# Patient Record
Sex: MALE | Race: WHITE | URBAN - METROPOLITAN AREA
[De-identification: names, ages, dates, MRNs, and addresses within clinical notes are randomized per-mention and may not be internally consistent; named-entity substitution may affect disease eponyms.]

---

## 2017-02-14 ENCOUNTER — OFFICE VISIT (OUTPATIENT)
Dept: FAMILY MEDICINE | Facility: CLINIC | Age: 42
End: 2017-02-14

## 2017-02-14 VITALS
OXYGEN SATURATION: 99 % | SYSTOLIC BLOOD PRESSURE: 115 MMHG | TEMPERATURE: 97.7 F | BODY MASS INDEX: 27.76 KG/M2 | HEART RATE: 71 BPM | WEIGHT: 209 LBS | DIASTOLIC BLOOD PRESSURE: 80 MMHG

## 2017-02-14 DIAGNOSIS — J45.901 ASTHMA EXACERBATION: Primary | ICD-10-CM

## 2017-02-14 PROCEDURE — 99213 OFFICE O/P EST LOW 20 MIN: CPT | Performed by: FAMILY MEDICINE

## 2017-02-14 RX ORDER — AZITHROMYCIN 250 MG/1
TABLET, FILM COATED ORAL
Qty: 6 TABLET | Refills: 0 | Status: SHIPPED | OUTPATIENT
Start: 2017-02-14 | End: 2018-02-26

## 2017-02-14 NOTE — PROGRESS NOTES
14 days of purulent cough and sore throat. No fever or chills. No SOB or chest pain.  Nonsmoker, but mild persistent asthma. Remodeling with a lot of dust.  Current Outpatient Prescriptions   Medication     budesonide-formoterol (SYMBICORT) 80-4.5 MCG/ACT inhaler     albuterol (PROAIR HFA, PROVENTIL HFA, VENTOLIN HFA) 108 (90 BASE) MCG/ACT inhaler     No current facility-administered medications for this visit.        ROS: no nausea or vomiting  OBJECTIVE: /80 (BP Location: Left arm)  Pulse 71  Temp 97.7  F (36.5  C)  Wt 94.8 kg (209 lb)  SpO2 99%  BMI 27.76 kg/m2   NAD except fatigue. TM's OK. Turbinates normal. Pharynx injected. No nodes. Lungs are clear, and cor RRR.  ASSESSMENT: asthma with exacerbation, prob URI but remodeling also  PLAN: zpak

## 2017-02-14 NOTE — MR AVS SNAPSHOT
"              After Visit Summary   2017    Bowen Youngblood    MRN: [id number]           Patient Information     Date Of Birth          1975        Visit Information        Provider Department      2017 11:15 AM Bib Wright MD [facility]        Today's Diagnoses     Asthma exacerbation    -  1       Follow-ups after your visit        Who to contact     If you have questions or need follow up information about today's clinic visit or your schedule please contact [facility] directly at [phone].  Normal or non-critical lab and imaging results will be communicated to you by MyChart, letter or phone within 4 business days after the clinic has received the results. If you do not hear from us within 7 days, please contact the clinic through Zuldihart or phone. If you have a critical or abnormal lab result, we will notify you by phone as soon as possible.  Submit refill requests through [employer] or call your pharmacy and they will forward the refill request to us. Please allow 3 business days for your refill to be completed.          Additional Information About Your Visit        MyChart Information     [employer] lets you send messages to your doctor, view your test results, renew your prescriptions, schedule appointments and more. To sign up, go to [email or web address] . Click on \"Log in\" on the left side of the screen, which will take you to the Welcome page. Then click on \"Sign up Now\" on the right side of the page.     You will be asked to enter the access code listed below, as well as some personal information. Please follow the directions to create your username and password.     Your access code is: [email or web address]  Expires: 5/15/2017 12:37 PM     Your access code will  in 90 days. If you need help or a new code, please call your [place] clinic or [phone].        Care EveryWhere ID     This is your Care EveryWhere ID. This could be used by other organizations " to access your [place] medical records  [id number]        Your Vitals Were     Pulse Temperature Pulse Oximetry BMI (Body Mass Index)          71 97.7  F (36.5  C) 99% 27.76 kg/m2         Blood Pressure from Last 3 Encounters:   02/14/17 115/80   11/18/16 104/70   08/24/15 120/78    Weight from Last 3 Encounters:   02/14/17 94.8 kg (209 lb)   11/18/16 97.1 kg (214 lb)   08/24/15 97.5 kg (215 lb)              Today, you had the following     No orders found for display         Today's Medication Changes          These changes are accurate as of: 2/14/17 12:37 PM.  If you have any questions, ask your nurse or doctor.               Start taking these medicines.        Dose/Directions    azithromycin 250 MG tablet   Commonly known as:  ZITHROMAX   Used for:  Asthma exacerbation   Started by:  Bib Wright MD        Two tablets first day, then one tablet daily for four days.   Quantity:  6 tablet   Refills:  0            Where to get your medicines      These medications were sent to [facility] [id number] [place] [address] [address] [facility] [address] [facility] [address]     Phone:  [phone]     azithromycin 250 MG tablet                Primary Care Provider Office Phone # Fax #    Bib Wright [phone]       [facility] [address]  [facility] [address]        Thank you!     Thank you for choosing [facility]  for your care. Our goal is always to provide you with excellent care. Hearing back from our patients is one way we can continue to improve our services. Please take a few minutes to complete the written survey that you may receive in the mail after your visit with us. Thank you!             Your Updated Medication List - Protect others around you: Learn how to safely use, store and throw away your medicines at www.disposemymeds.org.          This list is accurate as of: 2/14/17 12:37 PM.  Always use  your most recent med list.                   Brand Name Dispense Instructions for use    albuterol 108 (90 BASE) MCG/ACT Inhaler    PROAIR HFA/PROVENTIL HFA/VENTOLIN HFA    1 Inhaler    Inhale 2 puffs into the lungs every 6 hours as needed for shortness of breath / dyspnea or wheezing       azithromycin 250 MG tablet    ZITHROMAX    6 tablet    Two tablets first day, then one tablet daily for four days.       budesonide-formoterol 80-4.5 MCG/ACT Inhaler    SYMBICORT    3 Inhaler    INHALE 2 PUFFS BY MOUTH TWICE DAILY

## 2017-12-19 ENCOUNTER — TELEPHONE (OUTPATIENT)
Dept: FAMILY MEDICINE | Facility: CLINIC | Age: 42
End: 2017-12-19

## 2017-12-19 NOTE — LETTER
My Asthma Action Plan  Name: Bowen Youngblood   YOB: 1975  Date: 12/19/2017   My doctor: Bib Wright MD   My clinic: [facility]        My Control Medicine: Budesonide + formoterol (Symbicort) -  80/4.5 mcg 2 puffs BID  My Rescue Medicine: Albuterol (Proair/Ventolin/Proventil) inhaler 2 puffs Q 6hours PRN   My Asthma Severity: mild persistent  Avoid your asthma triggers: animal dander, mold and cold air               GREEN ZONE   Good Control    I feel good    No cough or wheeze    Can work, sleep and play without asthma symptoms       Take your asthma control medicine every day.     1. If exercise triggers your asthma, take your rescue medication    15 minutes before exercise or sports, and    During exercise if you have asthma symptoms  2. Spacer to use with inhaler: If you have a spacer, make sure to use it with your inhaler             YELLOW ZONE Getting Worse  I have ANY of these:    I do not feel good    Cough or wheeze    Chest feels tight    Wake up at night   1. Keep taking your Green Zone medications  2. Start taking your rescue medicine:    every 20 minutes for up to 1 hour. Then every 4 hours for 24-48 hours.  3. If you stay in the Yellow Zone for more than 12-24 hours, contact your doctor.  4. If you do not return to the Green Zone in 12-24 hours or you get worse, start taking your oral steroid medicine if prescribed by your provider.           RED ZONE Medical Alert - Get Help  I have ANY of these:    I feel awful    Medicine is not helping    Breathing getting harder    Trouble walking or talking    Nose opens wide to breathe       1. Take your rescue medicine NOW  2. If your provider has prescribed an oral steroid medicine, start taking it NOW  3. Call your doctor NOW  4. If you are still in the Red Zone after 20 minutes and you have not reached your doctor:    Take your rescue medicine again and    Call 911 or go to the emergency room right away    See your regular  doctor within 2 weeks of an Emergency Room or Urgent Care visit for follow-up treatment.        Electronically signed by: Tricia Glover, December 19, 2017    Annual Reminders:  Meet with Asthma Educator,  Flu Shot in the Fall, consider Pneumonia Vaccination for patients with asthma (aged 19 and older).    Pharmacy: [facility] [address] [place], [place] - [address] AT [facility] [address]                    Asthma Triggers  How To Control Things That Make Your Asthma Worse    Triggers are things that make your asthma worse.  Look at the list below to help you find your triggers and what you can do about them.  You can help prevent asthma flare-ups by staying away from your triggers.      Trigger                                                          What you can do   Cigarette Smoke  Tobacco smoke can make asthma worse. Do not allow smoking in your home, car or around you.  Be sure no one smokes at a child s day care or school.  If you smoke, ask your health care provider for ways to help you quit.  Ask family members to quit too.  Ask your health care provider for a referral to Quit Plan to help you quit smoking, or call 1-196-376-PLAN.     Colds, Flu, Bronchitis  These are common triggers of asthma. Wash your hands often.  Don t touch your eyes, nose or mouth.  Get a flu shot every year.     Dust Mites  These are tiny bugs that live in cloth or carpet. They are too small to see. Wash sheets and blankets in hot water every week.   Encase pillows and mattress in dust mite proof covers.  Avoid having carpet if you can. If you have carpet, vacuum weekly.   Use a dust mask and HEPA vacuum.   Pollen and Outdoor Mold  Some people are allergic to trees, grass, or weed pollen, or molds. Try to keep your windows closed.  Limit time out doors when pollen count is high.   Ask you health care provider about taking medicine during allergy season.     Animal Dander  Some people are allergic to skin flakes,  urine or saliva from pets with fur or feathers. Keep pets with fur or feathers out of your home.    If you can t keep the pet outdoors, then keep the pet out of your bedroom.  Keep the bedroom door closed.  Keep pets off cloth furniture and away from stuffed toys.     Mice, Rats, and Cockroaches  Some people are allergic to the waste from these pests.   Cover food and garbage.  Clean up spills and food crumbs.  Store grease in the refrigerator.   Keep food out of the bedroom.   Indoor Mold  This can be a trigger if your home has high moisture. Fix leaking faucets, pipes, or other sources of water.   Clean moldy surfaces.  Dehumidify basement if it is damp and smelly.   Smoke, Strong Odors, and Sprays  These can reduce air quality. Stay away from strong odors and sprays, such as perfume, powder, hair spray, paints, smoke incense, paint, cleaning products, candles and new carpet.   Exercise or Sports  Some people with asthma have this trigger. Be active!  Ask your doctor about taking medicine before sports or exercise to prevent symptoms.    Warm up for 5-10 minutes before and after sports or exercise.     Other Triggers of Asthma  Cold air:  Cover your nose and mouth with a scarf.  Sometimes laughing or crying can be a trigger.  Some medicines and food can trigger asthma.

## 2017-12-19 NOTE — TELEPHONE ENCOUNTER
Spoke with patient to update ACT and AAP.  Advised patient he was due to be seen, he states he will call and schedule after new year.  Tricia Gonzalez

## 2017-12-20 ASSESSMENT — ASTHMA QUESTIONNAIRES: ACT_TOTALSCORE: 21

## 2018-02-26 ENCOUNTER — OFFICE VISIT (OUTPATIENT)
Dept: FAMILY MEDICINE | Facility: CLINIC | Age: 43
End: 2018-02-26

## 2018-02-26 VITALS
RESPIRATION RATE: 16 BRPM | HEART RATE: 79 BPM | SYSTOLIC BLOOD PRESSURE: 116 MMHG | WEIGHT: 207 LBS | OXYGEN SATURATION: 97 % | DIASTOLIC BLOOD PRESSURE: 80 MMHG | BODY MASS INDEX: 27.5 KG/M2

## 2018-02-26 DIAGNOSIS — Z76.0 ENCOUNTER FOR MEDICATION REFILL: ICD-10-CM

## 2018-02-26 DIAGNOSIS — J45.30 MILD PERSISTENT ASTHMA, UNSPECIFIED WHETHER COMPLICATED: ICD-10-CM

## 2018-02-26 PROCEDURE — 99213 OFFICE O/P EST LOW 20 MIN: CPT | Performed by: FAMILY MEDICINE

## 2018-02-26 RX ORDER — BUDESONIDE AND FORMOTEROL FUMARATE DIHYDRATE 80; 4.5 UG/1; UG/1
AEROSOL RESPIRATORY (INHALATION)
Qty: 10.2 G | Refills: 6 | Status: SHIPPED | OUTPATIENT
Start: 2018-02-26 | End: 2018-04-09

## 2018-02-26 NOTE — MR AVS SNAPSHOT
"              After Visit Summary   2018    Bowen Youngblood    MRN: [id number]           Patient Information     Date Of Birth          1975        Visit Information        Provider Department      2018 4:15 PM Bib Wright MD [facility]        Today's Diagnoses     Encounter for medication refill        Mild persistent asthma, unspecified whether complicated           Follow-ups after your visit        Who to contact     If you have questions or need follow up information about today's clinic visit or your schedule please contact [facility] directly at [phone].  Normal or non-critical lab and imaging results will be communicated to you by ExactTargethart, letter or phone within 4 business days after the clinic has received the results. If you do not hear from us within 7 days, please contact the clinic through [employer]inn [employer] or phone. If you have a critical or abnormal lab result, we will notify you by phone as soon as possible.  Submit refill requests through Million[employer]1 or call your pharmacy and they will forward the refill request to us. Please allow 3 business days for your refill to be completed.          Additional Information About Your Visit        MyChart Information     [employer] lets you send messages to your doctor, view your test results, renew your prescriptions, schedule appointments and more. To sign up, go to [email or web address] . Click on \"Log in\" on the left side of the screen, which will take you to the Welcome page. Then click on \"Sign up Now\" on the right side of the page.     You will be asked to enter the access code listed below, as well as some personal information. Please follow the directions to create your username and password.     Your access code is: [email or web address]  Expires: 2018  6:17 PM     Your access code will  in 90 days. If you need help or a new code, please call your [place] clinic or [phone].        Care EveryWhere ID     This " is your Care EveryWhere ID. This could be used by other organizations to access your [place] medical records  [id number]        Your Vitals Were     Pulse Respirations Pulse Oximetry BMI (Body Mass Index)          79 16 97% 27.5 kg/m2         Blood Pressure from Last 3 Encounters:   02/26/18 116/80   02/14/17 115/80   11/18/16 104/70    Weight from Last 3 Encounters:   02/26/18 93.9 kg (207 lb)   02/14/17 94.8 kg (209 lb)   11/18/16 97.1 kg (214 lb)              Today, you had the following     No orders found for display         Today's Medication Changes          These changes are accurate as of 2/26/18  6:17 PM.  If you have any questions, ask your nurse or doctor.               These medicines have changed or have updated prescriptions.        Dose/Directions    budesonide-formoterol 80-4.5 MCG/ACT Inhaler   Commonly known as:  SYMBICORT   This may have changed:  See the new instructions.   Used for:  Encounter for medication refill        INHALE 2 PUFFS BY MOUTH TWICE DAILY   Quantity:  10.2 g   Refills:  6         Stop taking these medicines if you haven't already. Please contact your care team if you have questions.     azithromycin 250 MG tablet   Commonly known as:  ZITHROMAX                Where to get your medicines      These medications were sent to [facility] [id number] [place] [address] [address] AT [facility] [address] [facility] [address]     Phone:  [phone]     budesonide-formoterol 80-4.5 MCG/ACT Inhaler                Primary Care Provider Office Phone # Fax #    Bib Wright [phone] [address]  [facility] [address]        Equal Access to Services     ALICE DOLAN AH: Melina He, jonas thomas, rubén kaalmaadam boggs, arjun jerry. So [facility] [phone].    ATENCIÓN: Si habla español, tiene a ugo disposición servicios gratuitos de asistencia lingüística. Llame  al [phone].    We comply with applicable federal civil rights laws and [place] laws. We do not discriminate on the basis of race, color, national origin, age, disability, sex, sexual orientation, or gender identity.            Thank you!     Thank you for choosing [facility]  for your care. Our goal is always to provide you with excellent care. Hearing back from our patients is one way we can continue to improve our services. Please take a few minutes to complete the written survey that you may receive in the mail after your visit with us. Thank you!             Your Updated Medication List - Protect others around you: Learn how to safely use, store and throw away your medicines at www.disposemymeds.org.          This list is accurate as of 2/26/18  6:17 PM.  Always use your most recent med list.                   Brand Name Dispense Instructions for use Diagnosis    albuterol 108 (90 BASE) MCG/ACT Inhaler    PROAIR HFA/PROVENTIL HFA/VENTOLIN HFA    1 Inhaler    Inhale 2 puffs into the lungs every 6 hours as needed for shortness of breath / dyspnea or wheezing    Asthma, mild persistent       budesonide-formoterol 80-4.5 MCG/ACT Inhaler    SYMBICORT    10.2 g    INHALE 2 PUFFS BY MOUTH TWICE DAILY    Encounter for medication refill

## 2018-02-26 NOTE — PROGRESS NOTES
Here for Symbicort refill. Doing very well with his asthma. No hospitalization. His last albuterol inhaler was prescribed 3 years ago. using Symbicort once a day.  OBJECTIVE: /80  Pulse 79  Resp 16  Wt 93.9 kg (207 lb)  SpO2 97%  BMI 27.5 kg/m2 NAD good mood. Lungs are clear, with good flow  (Z76.0) Encounter for medication refill  Comment:   Plan: budesonide-formoterol (SYMBICORT) 80-4.5         MCG/ACT Inhaler            (J45.30) Mild persistent asthma, unspecified whether complicated  Comment:   Plan: no change

## 2018-04-09 ENCOUNTER — TELEPHONE (OUTPATIENT)
Dept: FAMILY MEDICINE | Facility: CLINIC | Age: 43
End: 2018-04-09

## 2018-04-09 DIAGNOSIS — J45.909 ASTHMA: Primary | ICD-10-CM

## 2018-04-09 NOTE — TELEPHONE ENCOUNTER
Received fax from [employer] stating patients rx for Symbicort is not covered under his insurance any more. Per Dr Wright rx can be changed to Breo.   [facility]  Called patient to inform him of change in rx.

## 2019-04-11 DIAGNOSIS — J45.30 MILD PERSISTENT ASTHMA WITHOUT COMPLICATION: Primary | ICD-10-CM

## 2019-04-19 ENCOUNTER — OFFICE VISIT (OUTPATIENT)
Dept: FAMILY MEDICINE | Facility: CLINIC | Age: 44
End: 2019-04-19

## 2019-04-19 VITALS
OXYGEN SATURATION: 98 % | WEIGHT: 224 LBS | SYSTOLIC BLOOD PRESSURE: 128 MMHG | HEART RATE: 73 BPM | BODY MASS INDEX: 29.76 KG/M2 | DIASTOLIC BLOOD PRESSURE: 82 MMHG

## 2019-04-19 DIAGNOSIS — Z23 NEED FOR VACCINATION: Primary | ICD-10-CM

## 2019-04-19 DIAGNOSIS — J45.30 MILD PERSISTENT ASTHMA WITHOUT COMPLICATION: ICD-10-CM

## 2019-04-19 PROCEDURE — 99213 OFFICE O/P EST LOW 20 MIN: CPT | Mod: 25 | Performed by: FAMILY MEDICINE

## 2019-04-19 PROCEDURE — 90732 PPSV23 VACC 2 YRS+ SUBQ/IM: CPT | Performed by: FAMILY MEDICINE

## 2019-04-19 PROCEDURE — 90471 IMMUNIZATION ADMIN: CPT | Performed by: FAMILY MEDICINE

## 2019-04-19 RX ORDER — ALBUTEROL SULFATE 90 UG/1
2 AEROSOL, METERED RESPIRATORY (INHALATION) EVERY 6 HOURS PRN
Qty: 18 G | Refills: 11 | Status: SHIPPED | OUTPATIENT
Start: 2019-04-19

## 2019-04-19 NOTE — LETTER
My Asthma Action Plan  Name: Bowen Youngblood   YOB: 1975  Date: 4/24/2019   My doctor: Bowen Grewal MD   My clinic: [facility]        My Control Medicine: BREO  My Rescue Medicine: Albuterol (Proair/Ventolin/Proventil) inhaler .   My Asthma Severity: mild persistent  Avoid your asthma triggers: upper respiratory infections and pollens               GREEN ZONE   Good Control    I feel good    No cough or wheeze    Can work, sleep and play without asthma symptoms       Take your asthma control medicine every day.     1. If exercise triggers your asthma, take your rescue medication    15 minutes before exercise or sports, and    During exercise if you have asthma symptoms  2. Spacer to use with inhaler: If you have a spacer, make sure to use it with your inhaler             YELLOW ZONE Getting Worse  I have ANY of these:    I do not feel good    Cough or wheeze    Chest feels tight    Wake up at night   1. Keep taking your Green Zone medications  2. Start taking your rescue medicine:    every 20 minutes for up to 1 hour. Then every 4 hours for 24-48 hours.  3. If you stay in the Yellow Zone for more than 12-24 hours, contact your doctor.  4. If you do not return to the Green Zone in 12-24 hours or you get worse, start taking your oral steroid medicine if prescribed by your provider.           RED ZONE Medical Alert - Get Help  I have ANY of these:    I feel awful    Medicine is not helping    Breathing getting harder    Trouble walking or talking    Nose opens wide to breathe       1. Take your rescue medicine NOW  2. If your provider has prescribed an oral steroid medicine, start taking it NOW  3. Call your doctor NOW  4. If you are still in the Red Zone after 20 minutes and you have not reached your doctor:    Take your rescue medicine again and    Call 911 or go to the emergency room right away    See your regular doctor within 2 weeks of an Emergency Room or Urgent Care visit for  follow-up treatment.          Annual Reminders:  Meet with Asthma Educator,  Flu Shot in the Fall, consider Pneumonia Vaccination for patients with asthma (aged 19 and older).    Pharmacy: [facility] [address] [place], [place] - [address] AT [facility] OF [address]                      Asthma Triggers  How To Control Things That Make Your Asthma Worse    Triggers are things that make your asthma worse.  Look at the list below to help you find your triggers and what you can do about them.  You can help prevent asthma flare-ups by staying away from your triggers.      Trigger                                                          What you can do   Cigarette Smoke  Tobacco smoke can make asthma worse. Do not allow smoking in your home, car or around you.  Be sure no one smokes at a child s day care or school.  If you smoke, ask your health care provider for ways to help you quit.  Ask family members to quit too.  Ask your health care provider for a referral to Quit Plan to help you quit smoking, or call 3-308-037-PLAN.     Colds, Flu, Bronchitis  These are common triggers of asthma. Wash your hands often.  Don t touch your eyes, nose or mouth.  Get a flu shot every year.     Dust Mites  These are tiny bugs that live in cloth or carpet. They are too small to see. Wash sheets and blankets in hot water every week.   Encase pillows and mattress in dust mite proof covers.  Avoid having carpet if you can. If you have carpet, vacuum weekly.   Use a dust mask and HEPA vacuum.   Pollen and Outdoor Mold  Some people are allergic to trees, grass, or weed pollen, or molds. Try to keep your windows closed.  Limit time out doors when pollen count is high.   Ask you health care provider about taking medicine during allergy season.     Animal Dander  Some people are allergic to skin flakes, urine or saliva from pets with fur or feathers. Keep pets with fur or feathers out of your home.    If you can t keep the pet  outdoors, then keep the pet out of your bedroom.  Keep the bedroom door closed.  Keep pets off cloth furniture and away from stuffed toys.     Mice, Rats, and Cockroaches  Some people are allergic to the waste from these pests.   Cover food and garbage.  Clean up spills and food crumbs.  Store grease in the refrigerator.   Keep food out of the bedroom.   Indoor Mold  This can be a trigger if your home has high moisture. Fix leaking faucets, pipes, or other sources of water.   Clean moldy surfaces.  Dehumidify basement if it is damp and smelly.   Smoke, Strong Odors, and Sprays  These can reduce air quality. Stay away from strong odors and sprays, such as perfume, powder, hair spray, paints, smoke incense, paint, cleaning products, candles and new carpet.   Exercise or Sports  Some people with asthma have this trigger. Be active!  Ask your doctor about taking medicine before sports or exercise to prevent symptoms.    Warm up for 5-10 minutes before and after sports or exercise.     Other Triggers of Asthma  Cold air:  Cover your nose and mouth with a scarf.  Sometimes laughing or crying can be a trigger.  Some medicines and food can trigger asthma.

## 2019-04-20 ASSESSMENT — ASTHMA QUESTIONNAIRES: ACT_TOTALSCORE: 22

## 2019-04-24 NOTE — PROGRESS NOTES
SUBJECTIVE:Bowen  presents today for follow up asthma.  Age at onset of asthma symptoms years ago.    Diagnosis of asthma made on the basis of: symptoms and response to medications.  History is negative for oral steroids for exacerbations and emergency room visits.  Family history is positive for asthma and past medical history is negative for allergic rhinitis and atopic dermatitis.  Current symptoms include: none.   Precipitating factors: pollens and URIs.    Current treatments: BREO.  He  is compliant with medication.  He  does not monitor home peak flow readings.  He  uses his  rescue inhaler 0 times a day and 1 times a month in the middle of the night.  He  feels that his  asthma does not limit his  ability to perform daily activities or physical activity.    ROS:  CONSTITUTIONAL: NEGATIVE for fever, chills  EYES: NEGATIVE for vision changes   RESP: NEGATIVE for significant cough or SOB  CV: NEGATIVE for chest pain, palpitations   GI: NEGATIVE for nausea, abdominal pain, heartburn, or change in bowel habits  : NEGATIVE for frequency, dysuria, or hematuria  MUSCULOSKELETAL: NEGATIVE for significant arthralgias or myalgia  NEURO: NEGATIVE for weakness, dizziness or paresthesias or headache    OBJECTIVE:   /82   Pulse 73   Wt 101.6 kg (224 lb)   SpO2 98%   BMI 29.76 kg/m    Exam:  GENERAL APPEARANCE: healthy, alert and no distress  EYES: EOMI,  PERRL  HENT: ear canals and TM's normal and nose and mouth without ulcers or lesions  RESP: lungs clear to auscultation - no rales, rhonchi or wheezes  CV: regular rates and rhythm, normal S1 S2, no S3 or S4 and no murmur, click or rub     ASSESSMENT:   Asthma: mild persistent    PLAN:  No change in therapy  Patient received information on asthma today  Discussed asthma action plan, see patient instructions

## 2019-10-10 ENCOUNTER — TELEPHONE (OUTPATIENT)
Dept: FAMILY MEDICINE | Facility: CLINIC | Age: 44
End: 2019-10-10

## 2019-10-10 DIAGNOSIS — J45.30 MILD PERSISTENT ASTHMA WITHOUT COMPLICATION: Primary | ICD-10-CM

## 2019-10-10 NOTE — TELEPHONE ENCOUNTER
Patient called clinic requesting prescription for Advair diskus, insurance coverage has changed and Breo is no longer covered. Patient reports that he has used Advair in the past with success. Per Dr Grewal prescription for this sent to pharmacy. Tricia Gonzalez

## 2020-01-13 ENCOUNTER — TELEPHONE (OUTPATIENT)
Dept: FAMILY MEDICINE | Facility: CLINIC | Age: 45
End: 2020-01-13

## 2020-01-13 NOTE — TELEPHONE ENCOUNTER
Received fax from [employer] requesting PA for [employer].  Submitted by fax to [employer]. Will wait for rresponse.

## 2020-01-27 NOTE — TELEPHONE ENCOUNTER
Received notice that [employer] is not the correct insurance for patients prescriptions.  Submitted new PA through [employer] on 01/13.  Received fax back with approval. Approval dates 12/14/2019-01/12/2023.  Called Effie  on 01/12 to inform of approval.

## 2020-04-13 DIAGNOSIS — J45.30 MILD PERSISTENT ASTHMA WITHOUT COMPLICATION: ICD-10-CM

## 2020-04-13 RX ORDER — ALBUTEROL SULFATE 90 UG/1
2 AEROSOL, METERED RESPIRATORY (INHALATION) EVERY 6 HOURS PRN
Qty: 18 G | Refills: 11 | OUTPATIENT
Start: 2020-04-13

## 2020-04-14 ENCOUNTER — VIRTUAL VISIT (OUTPATIENT)
Dept: FAMILY MEDICINE | Facility: CLINIC | Age: 45
End: 2020-04-14

## 2020-04-14 DIAGNOSIS — J45.30 MILD PERSISTENT ASTHMA WITHOUT COMPLICATION: ICD-10-CM

## 2020-04-14 PROCEDURE — 99213 OFFICE O/P EST LOW 20 MIN: CPT | Mod: GT | Performed by: FAMILY MEDICINE

## 2020-04-14 ASSESSMENT — ASTHMA QUESTIONNAIRES
QUESTION_3 LAST FOUR WEEKS HOW OFTEN DID YOUR ASTHMA SYMPTOMS (WHEEZING, COUGHING, SHORTNESS OF BREATH, CHEST TIGHTNESS OR PAIN) WAKE YOU UP AT NIGHT OR EARLIER THAN USUAL IN THE MORNING: NOT AT ALL
QUESTION_5 LAST FOUR WEEKS HOW WOULD YOU RATE YOUR ASTHMA CONTROL: WELL CONTROLLED
QUESTION_2 LAST FOUR WEEKS HOW OFTEN HAVE YOU HAD SHORTNESS OF BREATH: THREE TO SIX TIMES A WEEK
QUESTION_4 LAST FOUR WEEKS HOW OFTEN HAVE YOU USED YOUR RESCUE INHALER OR NEBULIZER MEDICATION (SUCH AS ALBUTEROL): TWO OR THREE TIMES PER WEEK
QUESTION_1 LAST FOUR WEEKS HOW MUCH OF THE TIME DID YOUR ASTHMA KEEP YOU FROM GETTING AS MUCH DONE AT WORK, SCHOOL OR AT HOME: NONE OF THE TIME
ACT_TOTALSCORE: 20

## 2020-04-14 NOTE — PROGRESS NOTES
"  Problem(s) Oriented visit      Bowen Youngblood is being evaluated via a billable video visit.      The patient has been notified of following:     \"This video visit will be conducted via a call between you and your physician/provider. We have found that certain health care needs can be provided without the need for an in-person physical exam.  This service lets us provide the care you need with a video conversation.  If a prescription is necessary we can send it directly to your pharmacy.  If lab work is needed we can place an order for that and you can then stop by our lab to have the test done at a later time.    If during the course of the call the physician/provider feels a video visit is not appropriate, you will not be charged for this service.\"     Physician has received verbal consent for a Video Visit from the patient? Yes    SUBJECTIVE:                                                      Bowen Youngblood is a 44 year old male who is being seen through video consult for evaluation.  He has a history of well controlled asthma.    Asthma stable.  Has not had any recent breathing troubles beyond usual baseline.  Has not any acute respiratory events.  Ran out of advair and has felt very mild increased SOB since then.  Using medication as directed with reported side effects    ACT Total Scores 12/19/2017 4/19/2019 4/14/2020   ACT TOTAL SCORE (Goal Greater than or Equal to 20) 21 22 20   In the past 12 months, how many times did you visit the emergency room for your asthma without being admitted to the hospital? 0 0 0   In the past 12 months, how many times were you hospitalized overnight because of your asthma? 0 0 0           Histories:   Patient Active Problem List   Diagnosis     Asthma, mild persistent     Past Surgical History:   Procedure Laterality Date     TONSILLECTOMY         Social History     Tobacco Use     Smoking status: Former Smoker     Smokeless tobacco: Never Used   Substance Use Topics     " "Alcohol use: Yes     Alcohol/week: 4.0 standard drinks     Types: 4 Standard drinks or equivalent per week     Family History   Problem Relation Age of Onset     Hypertension Father      Coronary Artery Disease Mother 71           Reviewed and updated as needed this visit by Provider       ROS:    A 5 system review was completed and is as noted in HPI and otherwise negative.            OBJECTIVE:                                                      Objective    There were no vitals taken for this visit.  Estimated body mass index is 29.76 kg/m  as calculated from the following:    Height as of 11/18/16: 1.848 m (6' 0.75\").    Weight as of 4/19/19: 101.6 kg (224 lb).      Gen: Well appearing, NAD  Eyes: Clear  Resp: Breathing comfortably, NAD  Skin: Clear  Psych: normal mood and affect  Neuro: grossly normal              ASSESSMENT/PLAN:                                                        Mild persistent asthma without complication: stable, needs advair refill.    -     fluticasone-salmeterol (ADVAIR) 100-50 MCG/DOSE inhaler; Inhale 1 puff into the lungs every 12 hours  - Recommend CPE in 6 months, sooner if concerns          "

## 2020-04-14 NOTE — PATIENT INSTRUCTIONS
Patient Education     Understanding Asthma    Asthma is a long-term (chronic) lung condition. It involves the airways (bronchial tubes). It happens when a trigger causes your airways to swell and become narrow. The muscles around your airways start to tighten. When your airways start to narrow, air can't move in and out of your lungs very well. Mucus also builds up along the airways. This makes it even harder to move air in and out of your lungs.   Experts are not exactly sure what causes asthma. It may be caused by a mix of inherited and environmental factors. People with asthma may have no symptoms until they are exposed to an allergen or trigger.  Healthy lungs  Inside your lungs there are branching airways made of stretchy tissue. Each airway is wrapped with bands of muscle. The airways are smaller as they go deeper into the lungs. The smallest airways end in clusters of tiny balloon-like air sacs (alveoli). These clusters are surrounded by blood vessels. When you breathe in (inhale), air enters the lungs. It travels down through the airways until it reaches the air sacs. When you breathe out (exhale), air travels up through the airways and out of the lungs. The airways make mucus that traps particles you breathe in. Normally, the mucus is then swept out of the lungs by tiny hairs (cilia) that line the airways. The mucus is swallowed or coughed up during your day.  What the lungs do  The air you inhale contains oxygen. When oxygen reaches the air sacs, it passes into the blood vessels around the sacs. Your blood then sends oxygen to all of your cells. As you exhale, carbon dioxide is removed in a similar way from the blood in the air sacs, and from your body.  When you have asthma  People with asthma have very sensitive airways. This means the airways react to certain things called triggers. Triggers can include pollen, dust, or smoke. Triggers cause inflammation. This makes the airways swell and become  narrow. This is a long-lasting (chronic) problem. Your airways may not always be narrow enough so that you notice breathing problems.  Symptoms of chronic inflammation include:     Coughing (chronic)    A feeling of tightness in your chest    Feeling short of breath    Wheezing (a whistling noise, especially when breathing out)    Low energy or feeling tired  In some people, over time chronic mild inflammation can lead to lasting (permanent) scarring of airways and loss of lung function.  Asthma flare-ups  When sensitive airways are irritated by a trigger, the muscles around the airways tighten. The lining of the airways swells. Thick, sticky mucus increases and partly clogs the airways. All of this makes it harder to breathe.  Symptoms of flare-ups may include:    Coughing, especially at night. You may not be able to sleep because of coughing.    Getting tired or out of breath easily    Wheezing    Chest tightness    Faster breathing when at rest  Flare-ups can be life-threatening. In a severe flare-up, the muscle tightening, swelling, and mucus are worse. It s very hard to breathe. Your body can't get enough oxygen and can't remove carbon dioxide. Waste gas is trapped in the alveoli. Gas exchange can t occur. The body is not getting enough oxygen. Without oxygen, body tissues, especially brain tissue, begin to get damaged. If this goes on for long, it can lead to severe brain damage or death.  Call 911 (or have someone call for you) if you have any of these symptoms and they are not relieved right away by taking your quick-relief medicine as prescribed:    Trouble breathing    Feeling too short of breath to talk or walk    Lips or fingers turning blue    Feeling lightheaded or dizzy, as though you are about to pass out    Peak flow less than 50% of your personal best, if you use a peak flow meter  Managing your asthma  Asthma is a long-term condition. So it s important to work with your healthcare provider to  manage it. If you have asthma, you can prevent flare-ups. Develop an asthma action plan with your healthcare provider. It can help control your asthma and manage your symptoms. An asthma action plan also tells you and your family or friends what to do if your asthma flares up or gets worse.  Take your medicine as prescribed. Also learn about your asthma triggers. Knowing what causes your asthma to flare up in the first place can help you prevent future breathing problems.  If you smoke, get help to quit.  Date Last Reviewed: 3/1/2019    3623-9121 The "[employer] [address] [place] [id number]. All rights reserved. This information is not intended as a substitute for professional medical care. Always follow your healthcare professional's instructions.

## 2020-04-15 ASSESSMENT — ASTHMA QUESTIONNAIRES: ACT_TOTALSCORE: 20

## 2020-11-10 DIAGNOSIS — J45.30 MILD PERSISTENT ASTHMA WITHOUT COMPLICATION: ICD-10-CM

## 2020-11-10 NOTE — TELEPHONE ENCOUNTER
Advair.  Last addressed at 4/14/20 VV. Needs appt.         Mild persistent asthma without complication: stable, needs advair refill.    -     fluticasone-salmeterol (ADVAIR) 100-50 MCG/DOSE inhaler; Inhale 1 puff into the lungs every 12 hours  -     Recommend CPE in 6 months, sooner if concerns

## 2020-11-11 NOTE — TELEPHONE ENCOUNTER
Spoke with the patient, will call us back to schedule an appointment for cpx.  Needs to look at [facility] [place],   [facility]  [phone]  .

## 2024-12-23 ENCOUNTER — OFFICE VISIT (OUTPATIENT)
Dept: FAMILY MEDICINE | Facility: CLINIC | Age: 49
End: 2024-12-23

## 2024-12-23 VITALS
HEART RATE: 93 BPM | WEIGHT: 230.8 LBS | OXYGEN SATURATION: 98 % | DIASTOLIC BLOOD PRESSURE: 96 MMHG | HEIGHT: 73 IN | BODY MASS INDEX: 30.59 KG/M2 | SYSTOLIC BLOOD PRESSURE: 143 MMHG

## 2024-12-23 DIAGNOSIS — Z12.11 SCREEN FOR COLON CANCER: ICD-10-CM

## 2024-12-23 DIAGNOSIS — E78.5 DYSLIPIDEMIA: ICD-10-CM

## 2024-12-23 DIAGNOSIS — J45.30 MILD PERSISTENT ASTHMA WITHOUT COMPLICATION: Primary | ICD-10-CM

## 2024-12-23 DIAGNOSIS — N40.1 BENIGN PROSTATIC HYPERPLASIA WITH LOWER URINARY TRACT SYMPTOMS, SYMPTOM DETAILS UNSPECIFIED: ICD-10-CM

## 2024-12-23 RX ORDER — FLUTICASONE PROPIONATE AND SALMETEROL 100; 50 UG/1; UG/1
1 POWDER RESPIRATORY (INHALATION) 2 TIMES DAILY
COMMUNITY
End: 2024-12-23

## 2024-12-23 RX ORDER — TAMSULOSIN HYDROCHLORIDE 0.4 MG/1
1 CAPSULE ORAL DAILY
COMMUNITY

## 2024-12-23 RX ORDER — ATORVASTATIN CALCIUM 20 MG/1
TABLET, FILM COATED ORAL
COMMUNITY
Start: 2023-12-04 | End: 2024-12-23

## 2024-12-23 RX ORDER — ATORVASTATIN CALCIUM 20 MG/1
20 TABLET, FILM COATED ORAL DAILY
Qty: 90 TABLET | Refills: 3 | Status: SHIPPED | OUTPATIENT
Start: 2024-12-23

## 2024-12-23 RX ORDER — PROPRANOLOL HYDROCHLORIDE 40 MG/1
TABLET ORAL
COMMUNITY
Start: 2023-12-04

## 2024-12-23 RX ORDER — FLUTICASONE PROPIONATE AND SALMETEROL 100; 50 UG/1; UG/1
1 POWDER RESPIRATORY (INHALATION) 2 TIMES DAILY
Qty: 60 EACH | Refills: 5 | Status: SHIPPED | OUTPATIENT
Start: 2024-12-23

## 2024-12-23 NOTE — PATIENT INSTRUCTIONS
Patient Education   Preventive Care Advice   This is general advice given by our system to help you stay healthy. However, your care team may have specific advice just for you. Please talk to your care team about your preventive care needs.  Nutrition  Eat 5 or more servings of fruits and vegetables each day.  Try wheat bread, brown rice and whole grain pasta (instead of white bread, rice, and pasta).  Get enough calcium and vitamin D. Check the label on foods and aim for 100% of the RDA (recommended daily allowance).  Lifestyle  Exercise at least 150 minutes each week  (30 minutes a day, 5 days a week).  Do muscle strengthening activities 2 days a week. These help control your weight and prevent disease.  No smoking.  Wear sunscreen to prevent skin cancer.  Have a dental exam and cleaning every 6 months.  Yearly exams  See your health care team every year to talk about:  Any changes in your health.  Any medicines your care team has prescribed.  Preventive care, family planning, and ways to prevent chronic diseases.  Shots (vaccines)   HPV shots (up to age 26), if you've never had them before.  Hepatitis B shots (up to age 59), if you've never had them before.  COVID-19 shot: Get this shot when it's due.  Flu shot: Get a flu shot every year.  Tetanus shot: Get a tetanus shot every 10 years.  Pneumococcal, hepatitis A, and RSV shots: Ask your care team if you need these based on your risk.  Shingles shot (for age 50 and up)  General health tests  Diabetes screening:  Starting at age 35, Get screened for diabetes at least every 3 years.  If you are younger than age 35, ask your care team if you should be screened for diabetes.  Cholesterol test: At age 39, start having a cholesterol test every 5 years, or more often if advised.  Bone density scan (DEXA): At age 50, ask your care team if you should have this scan for osteoporosis (brittle bones).  Hepatitis C: Get tested at least once in your life.  STIs (sexually  transmitted infections)  Before age 24: Ask your care team if you should be screened for STIs.  After age 24: Get screened for STIs if you're at risk. You are at risk for STIs (including HIV) if:  You are sexually active with more than one person.  You don't use condoms every time.  You or a partner was diagnosed with a sexually transmitted infection.  If you are at risk for HIV, ask about PrEP medicine to prevent HIV.  Get tested for HIV at least once in your life, whether you are at risk for HIV or not.  Cancer screening tests  Cervical cancer screening: If you have a cervix, begin getting regular cervical cancer screening tests starting at age 21.  Breast cancer scan (mammogram): If you've ever had breasts, begin having regular mammograms starting at age 40. This is a scan to check for breast cancer.  Colon cancer screening: It is important to start screening for colon cancer at age 45.  Have a colonoscopy test every 10 years (or more often if you're at risk) Or, ask your provider about stool tests like a FIT test every year or Cologuard test every 3 years.  To learn more about your testing options, visit:   .  For help making a decision, visit:   https://bit.ly/jv04846.  Prostate cancer screening test: If you have a prostate, ask your care team if a prostate cancer screening test (PSA) at age 55 is right for you.  Lung cancer screening: If you are a current or former smoker ages 50 to 80, ask your care team if ongoing lung cancer screenings are right for you.  For informational purposes only. Not to replace the advice of your health care provider. Copyright   2023 [employer]. All rights reserved. Clinically reviewed by the [facility] Transitions Program. [employer] [id number] - REV 01/24.

## 2024-12-23 NOTE — PROGRESS NOTES
"  Yareli Wiseman is a 49 year old patient who presents to clinic to re-establish care.  He has overall doing well.    Asthma: mild persistent, on Wixela, doing well.    HLD: on atorvastatin, doing well.  Primary prevention    BPH: improved on tamsulosin    Elev PSA: improved on recheck at [facility] per patient.  Follows with Dr Ag        Review of Systems   Constitutional, HEENT, cardiovascular, pulmonary, GI, , musculoskeletal, neuro, skin, endocrine and psych systems are negative, except as otherwise noted.      Objective    BP (!) 143/96   Pulse 93   Ht 1.842 m (6' 0.5\")   Wt 104.7 kg (230 lb 12.8 oz)   SpO2 98%   BMI 30.87 kg/m      General: Well appearing, NAD  Heart: RRR, no murmur  Chest: Lungs CTAB  Skin: Clear  Psych: normal mood and affect        Mild persistent asthma without complication  stable/controlled. Cont current medication(s) and treatment  - fluticasone-salmeterol (ADVAIR) 100-50 MCG/ACT inhaler; Inhale 1 puff into the lungs 2 times daily.    Dyslipidemia  Recheck at CPE next year  Cont statin  - atorvastatin (LIPITOR) 20 MG tablet; Take 1 tablet (20 mg) by mouth daily.    Screen for colon cancer  We discussed the various options for colorectal cancer screening including colonoscopy, cologuard, and flexible sigmoidoscopy.  We discussed the potential benefits and harms of all options, including discussion of sensitivity, specificity and potential adverse events.  After discussion, the patient elected to proceed with cologuard    - COLOGUARD([employer]); Future    Follow up in 3-6 months for CPE          "

## 2025-03-01 DIAGNOSIS — J45.30 MILD PERSISTENT ASTHMA WITHOUT COMPLICATION: ICD-10-CM

## 2025-03-03 RX ORDER — FLUTICASONE PROPIONATE AND SALMETEROL 100; 50 UG/1; UG/1
1 POWDER RESPIRATORY (INHALATION) 2 TIMES DAILY
Qty: 60 EACH | Refills: 0 | Status: SHIPPED | OUTPATIENT
Start: 2025-03-03

## 2025-03-03 NOTE — CONFIDENTIAL NOTE
Med: ADVAIR INHALER    LOV (related): 12/23/24 - ASTHMA / MEDS    Last ACT Date: 4/14/2020  Last ACT Score: 20    Last AAP Date: 12/27/17  Last AAP Letter Date: 4/24/2019      Due for F/U around: 6/2025 FOR CPX    Next Appt: NONE

## 2025-05-17 ENCOUNTER — HEALTH MAINTENANCE LETTER (OUTPATIENT)
Age: 50
End: 2025-05-17

## 2025-05-21 ENCOUNTER — OFFICE VISIT (OUTPATIENT)
Dept: FAMILY MEDICINE | Facility: CLINIC | Age: 50
End: 2025-05-21

## 2025-05-21 VITALS
OXYGEN SATURATION: 97 % | WEIGHT: 230.6 LBS | HEIGHT: 72 IN | BODY MASS INDEX: 31.23 KG/M2 | DIASTOLIC BLOOD PRESSURE: 88 MMHG | HEART RATE: 77 BPM | SYSTOLIC BLOOD PRESSURE: 140 MMHG

## 2025-05-21 DIAGNOSIS — R06.83 SNORING: Primary | ICD-10-CM

## 2025-05-21 DIAGNOSIS — E66.811 CLASS 1 OBESITY DUE TO EXCESS CALORIES WITHOUT SERIOUS COMORBIDITY WITH BODY MASS INDEX (BMI) OF 31.0 TO 31.9 IN ADULT: ICD-10-CM

## 2025-05-21 DIAGNOSIS — J45.30 MILD PERSISTENT ASTHMA WITHOUT COMPLICATION: ICD-10-CM

## 2025-05-21 DIAGNOSIS — E66.09 CLASS 1 OBESITY DUE TO EXCESS CALORIES WITHOUT SERIOUS COMORBIDITY WITH BODY MASS INDEX (BMI) OF 31.0 TO 31.9 IN ADULT: ICD-10-CM

## 2025-05-21 ASSESSMENT — ASTHMA QUESTIONNAIRES
ACT_TOTALSCORE: 23
QUESTION_3 LAST FOUR WEEKS HOW OFTEN DID YOUR ASTHMA SYMPTOMS (WHEEZING, COUGHING, SHORTNESS OF BREATH, CHEST TIGHTNESS OR PAIN) WAKE YOU UP AT NIGHT OR EARLIER THAN USUAL IN THE MORNING: NOT AT ALL
QUESTION_5 LAST FOUR WEEKS HOW WOULD YOU RATE YOUR ASTHMA CONTROL: COMPLETELY CONTROLLED
QUESTION_2 LAST FOUR WEEKS HOW OFTEN HAVE YOU HAD SHORTNESS OF BREATH: ONCE OR TWICE A WEEK
QUESTION_1 LAST FOUR WEEKS HOW MUCH OF THE TIME DID YOUR ASTHMA KEEP YOU FROM GETTING AS MUCH DONE AT WORK, SCHOOL OR AT HOME: NONE OF THE TIME
QUESTION_4 LAST FOUR WEEKS HOW OFTEN HAVE YOU USED YOUR RESCUE INHALER OR NEBULIZER MEDICATION (SUCH AS ALBUTEROL): ONCE A WEEK OR LESS

## 2025-05-21 NOTE — PROGRESS NOTES
"Answers submitted by the patient for this visit:  General Questionnaire (Submitted on 5/21/2025)  Chief Complaint: Chronic problems general questions HPI Form  What is the reason for your visit today? : Sleep issues - wanted to discuss  How many days per week do you miss taking your medication?: 0  Questionnaire about: Chronic problems general questions HPI Form (Submitted on 5/21/2025)  Chief Complaint: Chronic problems general questions HPI Form      Subjective     Bowen is a 50 year old patient who presents to clinic for evaluation.  His wife notes he has started snoring sometime in the past year.  Feels weight is stable.  Mild daytime somnolence.  No chest pain, dyspnea.  Remains active,  healthy diet.  Is motivated to lose weight and interested in medication as he feels he cannot change much else.    Asthma is stable and well controlled        Review of Systems   Constitutional, HEENT, cardiovascular, pulmonary, GI, , musculoskeletal, neuro, skin, endocrine and psych systems are negative, except as otherwise noted.      Objective    BP (!) 140/88 (BP Location: Right arm)   Pulse 77   Ht 1.835 m (6' 0.25\")   Wt 104.6 kg (230 lb 9.6 oz)   SpO2 97%   BMI 31.06 kg/m      General: Well appearing, NAD  HEENT: oropharynx is patent.  Tonsils absent  Psych: normal mood and affect        No results found for this or any previous visit (from the past 24 hours).    Snoring  Weight loss  Sleep consult  - Adult Sleep Eval & Management  Referral - To a [facility] Location (Use POS/Location); Future    Class 1 obesity due to excess calories without serious comorbidity with body mass index (BMI) of 31.0 to 31.9 in adult  Discussed in detail  He feels he has nearly optimized non-pharmacologic interventions  Trial of zepbound  Proper use and potential benefits, harms and side effects discussed in detail.  - tirzepatide-Weight Management (ZEPBOUND) 2.5 MG/0.5ML prefilled pen; Inject 0.5 mLs (2.5 mg) " subcutaneously every 7 days.  - Adult Sleep Eval & Management  Referral - To a [facility] Location (Use POS/Location); Future    Mild persistent asthma without complication  stable/controlled. Cont current medication(s) and treatment      Follow up in 1-2 months

## 2025-05-28 ENCOUNTER — TELEPHONE (OUTPATIENT)
Dept: FAMILY MEDICINE | Facility: CLINIC | Age: 50
End: 2025-05-28

## 2025-05-28 NOTE — TELEPHONE ENCOUNTER
Zepbound prior authorization approved effective 4/28/25-1/23/26.   Patient and pharmacy informed.      Shraddha De Souza RN

## 2025-05-29 ENCOUNTER — TELEPHONE (OUTPATIENT)
Dept: FAMILY MEDICINE | Facility: CLINIC | Age: 50
End: 2025-05-29

## 2025-05-29 NOTE — TELEPHONE ENCOUNTER
Prior authorization done via CoverMyMeds for Wixela. Prior authorization was approved with approval dates 4/29/25-5/29/26. Pharmacy notified of approval. Tricia Gonzalez

## 2025-07-14 ENCOUNTER — OFFICE VISIT (OUTPATIENT)
Dept: FAMILY MEDICINE | Facility: CLINIC | Age: 50
End: 2025-07-14

## 2025-07-14 VITALS
DIASTOLIC BLOOD PRESSURE: 80 MMHG | BODY MASS INDEX: 29.31 KG/M2 | SYSTOLIC BLOOD PRESSURE: 121 MMHG | OXYGEN SATURATION: 97 % | WEIGHT: 217.6 LBS | HEART RATE: 69 BPM

## 2025-07-14 DIAGNOSIS — E66.3 OVERWEIGHT: ICD-10-CM

## 2025-07-14 PROCEDURE — 99213 OFFICE O/P EST LOW 20 MIN: CPT | Performed by: FAMILY MEDICINE

## 2025-07-14 PROCEDURE — 3074F SYST BP LT 130 MM HG: CPT | Performed by: FAMILY MEDICINE

## 2025-07-14 PROCEDURE — G2211 COMPLEX E/M VISIT ADD ON: HCPCS | Performed by: FAMILY MEDICINE

## 2025-07-14 PROCEDURE — 3079F DIAST BP 80-89 MM HG: CPT | Performed by: FAMILY MEDICINE

## 2025-07-14 RX ORDER — FLUTICASONE PROPIONATE AND SALMETEROL 100; 50 UG/1; UG/1
1 POWDER RESPIRATORY (INHALATION) 2 TIMES DAILY
COMMUNITY

## 2025-07-14 RX ORDER — ALFUZOSIN HYDROCHLORIDE 10 MG/1
10 TABLET, EXTENDED RELEASE ORAL DAILY
COMMUNITY

## 2025-07-14 NOTE — PROGRESS NOTES
Yareli Wiseman is a 50 year old patient who presents to clinic for follow up.    History of obesity: BMI 31 last visit.  Started zepbound.  Tolerating well.  Weight down 13 pounds.  Sleeping better.  Improved energy.  Mild constipation.  No other side effects.        Review of Systems   Constitutional, HEENT, cardiovascular, pulmonary, GI, , musculoskeletal, neuro, skin, endocrine and psych systems are negative, except as otherwise noted.      Objective    /80   Pulse 69   Wt 98.7 kg (217 lb 9.6 oz)   SpO2 97%   BMI 29.31 kg/m      General: Well appearing, NAD  Psych: normal mood and affect          Overweight  Excellent progress  Cont current dose as still effective  Reassess in 2 months at CPE, sooner if needed  Cont all non-pharmacologic interventions  - tirzepatide-Weight Management (ZEPBOUND) 2.5 MG/0.5ML prefilled pen; Inject 0.5 mLs (2.5 mg) subcutaneously every 7 days.    Follow up in 2 months          Answers submitted by the patient for this visit:  General Questionnaire (Submitted on 7/14/2025)  Chief Complaint: Chronic problems general questions HPI Form  What is the reason for your visit today? : medication follow up  How many servings of fruits and vegetables do you eat daily?: 2-3  On average, how many sweetened beverages do you drink each day (Examples: soda, juice, sweet tea, etc.  Do NOT count diet or artificially sweetened beverages)?: 1  How many minutes a day do you exercise enough to make your heart beat faster?: 30 to 60  How many days a week do you exercise enough to make your heart beat faster?: 4  How many days per week do you miss taking your medication?: 0  Questionnaire about: Chronic problems general questions HPI Form (Submitted on 7/14/2025)  Chief Complaint: Chronic problems general questions HPI Form